# Patient Record
Sex: FEMALE | Race: WHITE
[De-identification: names, ages, dates, MRNs, and addresses within clinical notes are randomized per-mention and may not be internally consistent; named-entity substitution may affect disease eponyms.]

---

## 2017-03-23 ENCOUNTER — HOSPITAL ENCOUNTER (EMERGENCY)
Dept: HOSPITAL 10 - FTE | Age: 37
Discharge: HOME | End: 2017-03-23
Payer: COMMERCIAL

## 2017-03-23 VITALS
HEIGHT: 66 IN | WEIGHT: 139.99 LBS | BODY MASS INDEX: 22.5 KG/M2 | BODY MASS INDEX: 22.5 KG/M2 | WEIGHT: 139.99 LBS | HEIGHT: 66 IN

## 2017-03-23 DIAGNOSIS — L25.9: Primary | ICD-10-CM

## 2017-03-23 PROCEDURE — 99282 EMERGENCY DEPT VISIT SF MDM: CPT

## 2017-03-23 NOTE — ERD
ER Documentation


Chief Complaint


Date/Time


DATE: 3/23/17 


TIME: 22:15


Chief Complaint


linear pattern rash





HPI


Patient is a 37-year-old female with no medical problems who presents with a 

rash.  The patient has no new soaps, lotions, or medications.  It has been 

there for 2 days and is itchy.  She tried Benadryl.  She does have a primary 

doctor but has not seen her primary doctor as of yet.  She has never had this 

before.





ROS


All systems reviewed and are negative except as per history of present illness.





Medications


Home Meds


Active Scripts


Prednisone* (Prednisone*) 20 Mg Tab, 60 MG PO DAILY for 4 Days, TAB


   Prov:NIXON YOUNG MD         3/23/17





Allergies


Allergies:  


Coded Allergies:  


     No Known Allergies (Verified  Allergy, 5/16/12)





PMhx/Soc


Medical and Surgical Hx:  pt denies Medical Hx, pt denies Surgical Hx


Hx Alcohol Use:  No


Hx Substance Use:  No


Hx Tobacco Use:  No


Smoking Status:  Never smoker





FmHx


Family History:  No diabetes





Physical Exam


Vitals





Vital Signs








  Date Time  Temp Pulse Resp B/P Pulse Ox O2 Delivery O2 Flow Rate FiO2


 


3/23/17 20:06 99.1 86 20 124/80 100   








Physical Exam


Const: No acute distress


Head:   Atraumatic 


Eyes:    Normal Conjunctiva


ENT:    Normal External Ears, Nose and Mouth.


Neck:               Full range of motion..~ No meningismus.


Resp:    Clear to auscultation bilaterally


Cardio:    Regular rate and rhythm, no murmurs


Abd:    Soft, non tender, non distended. Normal bowel sounds


Skin:   Patient has a papular rash with blanching, no petechia and no purpura, 

it is diffusely around the body but not inside the mouth or in hands


Back:    No midline or flank tenderness


Ext:    No cyanosis, or edema


Neur:    Awake and alert


Psych:    Normal Mood and Affect


Results 24 hrs





 Current Medications








 Medications


  (Trade)  Dose


 Ordered  Sig/Sveta


 Route


 PRN Reason  Start Time


 Stop Time Status Last Admin


Dose Admin


 


 Prednisone


  (Prednisone)  60 mg  ONCE  ONCE


 PO


   3/23/17 21:00


 3/23/17 21:01 DC 3/23/17 21:06


 











Procedures/MDM


Smoking Cessation Therapy:  Pt. was lectured for greater than  3 minutes on the 

health risks of continued smoking and the benefits of cessation.





Patient is a 37-year-old female who presents with what appears to be a 

dermatitis.  I am not sure as to the cause of the dermatitis at this time does 

not appear to be petechial or purpuric.  I do not think this is a life-

threatening rash.  This seems to be more allergy related than infectious and 

therefore I will treat with 5 days of prednisone.  The first dose was given in 

the emergency department.  She can use Benadryl as needed for itching.  She 

should follow-up with her primary doctor within 24-48 hours for reevaluation.  

She can return sooner for any worsening symptoms.





Departure


Diagnosis:  


 Primary Impression:  


 Dermatitis


 Additional Impression:  


 Rash


Condition:  Fair


Patient Instructions:  Dermatitis, Non-Specific


Referrals:  


Your doctor





Additional Instructions:  


Call your primary care doctor TOMORROW for an appointment during the next 1 

WEEK.Tell the  that you were referred from this facility.See the 

doctor sooner or return here if your  condition worsens before your appointment 

time.











NIXON YOUNG MD Mar 23, 2017 22:17

## 2017-05-20 ENCOUNTER — HOSPITAL ENCOUNTER (INPATIENT)
Dept: HOSPITAL 10 - E/R | Age: 37
LOS: 3 days | Discharge: HOME | DRG: 512 | End: 2017-05-23
Attending: FAMILY MEDICINE | Admitting: FAMILY MEDICINE
Payer: COMMERCIAL

## 2017-05-20 VITALS — RESPIRATION RATE: 18 BRPM | DIASTOLIC BLOOD PRESSURE: 59 MMHG | SYSTOLIC BLOOD PRESSURE: 123 MMHG | HEART RATE: 65 BPM

## 2017-05-20 VITALS
WEIGHT: 143.3 LBS | BODY MASS INDEX: 26.37 KG/M2 | HEIGHT: 62 IN | HEIGHT: 62 IN | WEIGHT: 143.3 LBS | BODY MASS INDEX: 26.37 KG/M2

## 2017-05-20 VITALS — SYSTOLIC BLOOD PRESSURE: 105 MMHG | DIASTOLIC BLOOD PRESSURE: 56 MMHG | RESPIRATION RATE: 18 BRPM

## 2017-05-20 DIAGNOSIS — S52.612A: ICD-10-CM
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
## 2017-05-20 DIAGNOSIS — D64.9: ICD-10-CM
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
## 2017-05-20 DIAGNOSIS — Z72.0: ICD-10-CM

## 2017-05-20 DIAGNOSIS — S52.502A: Primary | ICD-10-CM
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
## 2017-05-20 LAB
ADD SCAN DIFF: NO
ADD UMIC: NO
ALBUMIN SERPL-MCNC: 4.6 G/DL (ref 3.3–4.9)
ALBUMIN/GLOB SERPL: 1.48 {RATIO}
ALP SERPL-CCNC: 74 IU/L (ref 42–121)
ALT SERPL-CCNC: 24 IU/L (ref 13–69)
ANION GAP SERPL CALC-SCNC: 12 MMOL/L (ref 8–16)
AST SERPL-CCNC: 29 IU/L (ref 15–46)
BASOPHILS # BLD AUTO: 0 10^3/UL (ref 0–0.1)
BASOPHILS NFR BLD: 0.1 % (ref 0–2)
BILIRUB DIRECT SERPL-MCNC: 0 MG/DL (ref 0–0.2)
BILIRUB SERPL-MCNC: 0.3 MG/DL (ref 0.2–1.3)
BUN SERPL-MCNC: 14 MG/DL (ref 7–20)
CALCIUM SERPL-MCNC: 9.7 MG/DL (ref 8.4–10.2)
CHLORIDE SERPL-SCNC: 107 MMOL/L (ref 97–110)
CO2 SERPL-SCNC: 25 MMOL/L (ref 21–31)
COLOR UR: (no result)
CREAT SERPL-MCNC: 0.84 MG/DL (ref 0.44–1)
EOSINOPHIL # BLD: 0.1 10^3/UL (ref 0–0.5)
EOSINOPHIL NFR BLD: 0.8 % (ref 0–7)
ERYTHROCYTE [DISTWIDTH] IN BLOOD BY AUTOMATED COUNT: 12.3 % (ref 11.5–14.5)
GLOBULIN SER-MCNC: 3.1 G/DL (ref 1.3–3.2)
GLUCOSE SERPL-MCNC: 99 MG/DL (ref 70–220)
GLUCOSE UR STRIP-MCNC: NEGATIVE %
HCT VFR BLD CALC: 39.6 % (ref 37–47)
HGB BLD-MCNC: 13.1 G/DL (ref 12–16)
INR PPP: 0.87
INR PPP: 0.99
KETONES UR STRIP.AUTO-MCNC: 15 MG/DL
LYMPHOCYTES # BLD AUTO: 2.2 10^3/UL (ref 0.8–2.9)
LYMPHOCYTES NFR BLD AUTO: 21.7 % (ref 15–51)
MCH RBC QN AUTO: 29.6 PG (ref 29–33)
MCHC RBC AUTO-ENTMCNC: 33.1 G/DL (ref 32–37)
MCV RBC AUTO: 89.6 FL (ref 82–101)
MONOCYTES # BLD: 0.6 10^3/UL (ref 0.3–0.9)
MONOCYTES NFR BLD: 5.4 % (ref 0–11)
NEUTROPHILS # BLD: 7.4 10^3/UL (ref 1.6–7.5)
NEUTROPHILS NFR BLD AUTO: 71.6 % (ref 39–77)
NITRITE UR QL STRIP.AUTO: NEGATIVE
NRBC # BLD MANUAL: 0 10^3/UL (ref 0–0)
NRBC BLD QL: 0 /100WBC (ref 0–0)
PLATELET # BLD: 250 10^3/UL (ref 140–415)
PMV BLD AUTO: 12.2 FL (ref 7.4–10.4)
POTASSIUM SERPL-SCNC: 3.6 MMOL/L (ref 3.5–5.1)
PROT SERPL-MCNC: 7.7 G/DL (ref 6.1–8.1)
PROTHROMBIN TIME: 11.8 SEC (ref 12.2–14.2)
PROTHROMBIN TIME: 13.1 SEC (ref 12.2–14.2)
PT RATIO: 0.9
PT RATIO: 1
RBC # BLD AUTO: 4.42 10^6/UL (ref 4.2–5.4)
RBC # UR AUTO: NEGATIVE /UL
SODIUM SERPL-SCNC: 140 MMOL/L (ref 135–144)
URINE BILIRUBIN (DIP): NEGATIVE
URINE TOTAL PROTEIN (DIP): NEGATIVE
UROBILINOGEN UR STRIP-ACNC: (no result) (ref 0.1–1)
WBC # BLD AUTO: 10.3 10^3/UL (ref 4.8–10.8)
WBC # UR STRIP: NEGATIVE /UL

## 2017-05-20 PROCEDURE — 93005 ELECTROCARDIOGRAM TRACING: CPT

## 2017-05-20 PROCEDURE — 71010: CPT

## 2017-05-20 PROCEDURE — 85610 PROTHROMBIN TIME: CPT

## 2017-05-20 PROCEDURE — C1713 ANCHOR/SCREW BN/BN,TIS/BN: HCPCS

## 2017-05-20 PROCEDURE — 80048 BASIC METABOLIC PNL TOTAL CA: CPT

## 2017-05-20 PROCEDURE — 85025 COMPLETE CBC W/AUTO DIFF WBC: CPT

## 2017-05-20 PROCEDURE — 84479 ASSAY OF THYROID (T3 OR T4): CPT

## 2017-05-20 PROCEDURE — 36415 COLL VENOUS BLD VENIPUNCTURE: CPT

## 2017-05-20 PROCEDURE — 80053 COMPREHEN METABOLIC PANEL: CPT

## 2017-05-20 PROCEDURE — 83735 ASSAY OF MAGNESIUM: CPT

## 2017-05-20 PROCEDURE — 96374 THER/PROPH/DIAG INJ IV PUSH: CPT

## 2017-05-20 PROCEDURE — 80061 LIPID PANEL: CPT

## 2017-05-20 PROCEDURE — 85730 THROMBOPLASTIN TIME PARTIAL: CPT

## 2017-05-20 PROCEDURE — 83690 ASSAY OF LIPASE: CPT

## 2017-05-20 PROCEDURE — 73200 CT UPPER EXTREMITY W/O DYE: CPT

## 2017-05-20 PROCEDURE — 83036 HEMOGLOBIN GLYCOSYLATED A1C: CPT

## 2017-05-20 PROCEDURE — 81003 URINALYSIS AUTO W/O SCOPE: CPT

## 2017-05-20 PROCEDURE — 84436 ASSAY OF TOTAL THYROXINE: CPT

## 2017-05-20 PROCEDURE — 73100 X-RAY EXAM OF WRIST: CPT

## 2017-05-20 PROCEDURE — 96375 TX/PRO/DX INJ NEW DRUG ADDON: CPT

## 2017-05-20 PROCEDURE — 84100 ASSAY OF PHOSPHORUS: CPT

## 2017-05-20 PROCEDURE — 84443 ASSAY THYROID STIM HORMONE: CPT

## 2017-05-20 RX ADMIN — HYDROCODONE BITARTRATE AND ACETAMINOPHEN PRN TAB: 5; 325 TABLET ORAL at 16:35

## 2017-05-20 RX ADMIN — HYDROCODONE BITARTRATE AND ACETAMINOPHEN PRN TAB: 5; 325 TABLET ORAL at 23:50

## 2017-05-20 RX ADMIN — HYDROCODONE BITARTRATE AND ACETAMINOPHEN PRN TAB: 5; 325 TABLET ORAL at 18:10

## 2017-05-20 NOTE — RADRPT
PROCEDURE:   CT left upper extremity

 

CLINICAL INDICATION:   Trauma, pain

 

TECHNIQUE:   CT of the left forearm and hand was performed.  Images were reconstructed in the axial,
 sagittal and coronal planes.  No intravenous contrast was given.  One or more on the following dose
 reduction techniques were used: automated exposure control, adjustment of the mA and/or kV accordin
g to patient size, use of iterative reconstruction technique.  CTDI = 7.38 mGy. DLP = 139.36 mGy-cm.

 

COMPARISON:   X-ray, 05/20/2017

 

FINDINGS:

Again noted is acute comminuted fracture of the distal radial metaphysis extending to the distal rad
ial articular surface.  There is severe displacement and angulation of fracture fragments.  Also not
ed is a mildly displaced acute fracture of the ulnar styloid process.  No proximal radial or ulnar f
racture is identified.  There is no evidence of carpal fracture.  No dislocation is identified.  Bon
y mineralization is normal.  There is no radiodense foreign body.  Forearm and wrist cast is identif
ied.

 

IMPRESSION:

 

1.  Acute comminuted fracture of the distal radial metaphysis extending to the articular surface, wi
th severe angulation and displacement of fracture fragments.

2.  Mildly displaced acute fracture of the ulnar styloid process.

3.  Forearm and wrist cast is in place.

 

RPTAT: QQ

_____________________________________________ 

.Sree Fink MD, MD           Date    Time 

Electronically viewed and signed by .Sree Fink MD, MD on 05/20/2017 13:14 

 

D:  05/20/2017 13:14  T:  05/20/2017 13:14

.R/

## 2017-05-20 NOTE — RADRPT
PROCEDURE:   XR Chest. 

 

CLINICAL INDICATION:   Chest pain 

 

TECHNIQUE:   Single portable view  of the chest was obtained 

 

COMPARISON:   None 

 

FINDINGS:

The heart is enlarged.

The lungs are clear. 

There is no pleural effusion or pneumothorax.   

 

RPTAT: AA

 

IMPRESSION:

Mild Cardiomegaly.

_____________________________________________ 

.Surinder Gudino MD, MD           Date    Time 

Electronically viewed and signed by .Surinder Gudino MD, MD on 05/20/2017 11:47 

 

D:  05/20/2017 11:47  T:  05/20/2017 11:47

.S/

## 2017-05-20 NOTE — HP
Date/Time of Note


Date/Time of Note


DATE: 17 


TIME: 16:29





Assessment/Plan


VTE Prophylaxis


VTE Prophylaxis Intervention:  SCD's





Assessment/Plan


Chief Complaint/Hosp Course


Impression and plan





1.  Acute comminuted fracture of the distal radial metaphysis extending to the 

articular surface with severe angulation and displacement of fracture fragments 

on the left side as well as a mildly displaced acute fracture of the ulnar 

styloid process on the left.  Will provide with adequate analgesics.  Surgeon 

consulted.  Tentative plan for surgical intervention.





2.  Reported cigarette smoking.  Cessation advise





Admission process 40 minutes





Discussed plan of care with Dr. Diamond


Problems:  





HPI/ROS


Admit Date/Time


Admit Date/Time


May 20, 2017 at 12:08





Hx of Present Illness


This is a 37-year-old female with no reported past medical history who came to 

[facility] due to reports of recent fall during exercise 

subsequently leading to a left comminuted intra-articular distal radial 

fracture on the left side with noted moderate to severe displacement of 

fracture fragments.  According to the patient she was doing cross fit exercise 

and a jump rope routine when her leg got caught on the rope and she fell and 

braced herself on the left side.  She denies any loss of consciousness 

shortness of breath or headaches precipitating the fall.  She denied any trauma 

to her head and reports she somehow fell on her left wrist.  No other reports 

of pain on either parts of her body.  She subsequently went to [facility].  Upon further imaging of her left wrist as previously 

mentioned she was found to have a comminuted intra-articular distal radial 

fracture with moderate to severe displacement of the fracture fragments.  There 

is also seen ulnar styloid process fracture seen.  CT scan of the left upper 

extremity also confirmed acute comminuted fracture of the distal radial 

metaphysis extending into the articular surface with severe angulation and 

displacement fracture fragments.  There is also seen mildly displaced acute 

fracture of the ulnar styloid process.  CBC and BMP otherwise unremarkable and 

vital signs remained stable.  She still does report having left wrist pain but 

is controlled with opioid medication.  We will evaluate her for the 

aformentiond issues





PMH/Family/Social


Past Medical History


Medical/surgical history





1.   in 


2.  Reported tummy tuck in 





Family History


Significant Family History:  other (Mother side: Diabetes)





Social History


Alcohol Use:  occasionally


Smoking Status:  Current some day smoker


Drug Use:  none





Exam/Review of Systems


Vital Signs


Vitals





 Vital Signs








  Date Time  Temp Pulse Resp B/P Pulse Ox O2 Delivery O2 Flow Rate FiO2


 


17 13:20  77 18 118/68 99 Room Air  


 


17 10:18 97.8       











Exam


Constitutional:  alert, oriented


Psych:  nl mood/affect


Head:  normocephalic


Eyes:  nl conjunctiva


Neck:  supple, 


   No jvd


Respiratory:  clear to auscultation, normal air movement


Cardiovascular:  nl pulses, regular rate and rhythm


Gastrointestinal:  non-tender, soft


Musculoskeletal:  other


Extremities:  normal pulses


Neurological:  nl mental status, nl speech





Labs


Result Diagram:  


17 1200                                                                   

             17 1200








Medications


Medications





 Current Medications


Ondansetron HCl (Zofran Inj) 4 mg Q6H  PRN IV NAUSEA AND/OR VOMITING;  Start  at 16:30


Acetaminophen (Tylenol Tab) 650 mg Q6H  PRN PO PAIN LEVEL 1-3 OR FEVER;  Start  at 16:30


Acetaminophen (Tylenol Supp) 650 mg Q6H  PRN [place] PAIN LEVEL 1-3 OR FEVER;  Start 

17 at 16:30


Acetaminophen/ Hydrocodone Bitart (Norco (5/325)) 1 tab Q6H  PRN PO MODERATE 

PAIN LEVEL 4-6;  Start 17 at 16:30


Acetaminophen/ Hydrocodone Bitart (Norco (5/325)) 2 tab Q6H  PRN PO SEVERE PAIN 

LEVEL 7-10;  Start 17 at 16:30


Morphine Sulfate (morphine) 2 mg Q4H  PRN IV SEVERE PAIN LEVEL 7-10;  Start  at 16:30


Hydromorphone HCl (Dilaudid) 0.5 mg Q4H  PRN IV SEVERE PAIN LEVEL 7-10;  Start  at 16:30


Docusate Sodium (Colace) 100 mg Q12H  PRN PO CONSTIPATION;  Start 17 at 16:

30


Magnesium Hydroxide (Milk Of Mag) 30 ml DAILY  PRN PO CONSTIPATION;  Start  at 16:30


Bisacodyl (Dulcolax Supp) 10 mg DAILY  PRN [place] CONSTIPATION;  Start 17 at 16

:30











AUSTIN DENNIS May 20, 2017 16:34

## 2017-05-20 NOTE — ERD
ER Documentation


Chief Complaint


Date/Time


DATE: 5/20/17 


TIME: 11:17


Chief Complaint


left wrist injury/deformity noted





HPI


37-year-old woman brought in by EMS for left wrist pain and swelling after 

falling on outstretched hand while exercising.  Patient denies head or neck 

injury or loss of consciousness.  She was placed in an air splint by EMS and 

transported here.  She has had no chest pain or shortness of breath, no 

vomiting or diarrhea, no headache or blurry vision.





ROS


All systems reviewed and are negative except as per history of present illness.





Medications


Home Meds


Active Scripts


Prednisone* (Prednisone*) 20 Mg Tab, 60 MG PO DAILY for 4 Days, TAB


   Prov:NIXON YOUNG MD         3/23/17





Allergies


Allergies:  


Coded Allergies:  


     No Known Allergies (Verified  Allergy, 5/16/12)





PMhx/Soc


None


Hx Alcohol Use:  No


Hx Substance Use:  No


Hx Tobacco Use:  No





FmHx


Family History:  No diabetes





Physical Exam


Vitals





Vital Signs








  Date Time  Temp Pulse Resp B/P Pulse Ox O2 Delivery O2 Flow Rate FiO2


 


5/20/17 10:18 97.8 78 16 106/58 99   








Physical Exam


GENERAL: Well-developed, well-nourished, in moderate pain


HEENT: Moist mucous membranes, pink conjunctiva, no cervical spine tenderness 

or step-off deformities, no goiter, no jaundice or icterus, extraocular 

movements intact without pain. No submandibular induration, and no pharyngeal 

erythema


NEURO: Alert and oriented 3, cranial nerves II through XII intact bilaterally, 

pupils equal round reactive to light, no focal deficits or facial asymmetry, 

sensation intact distally Strength 5/5 in upper and lower extremities 

bilaterally


CARDIAC: Regular rate and rhythm, no murmurs rubs or gallops


LUNGS: Clear bilaterally no wheezing crackles or stridor


ABDOMEN: Soft nontender, no guarding, no rigidity, no rebound, no psoas sign no 

obturator sign. Normoactive bowel sounds


SKIN: Warm and dry to touch, positive hematoma and contusion to the left dorsal 

and volar wrist, no target lesions, and without ulcers


EXTREMITIES: Positive dinner fork deformity of the left wrist with skin tenting 

edema and hematoma formation.  Distal pulses equal bilateral, cap refill less 

than 2 second


PSYCH: Normal affect without agitation or irritability


Result Diagram:  


5/20/17 1200





Results 24 hrs





 Laboratory Tests








Test


  5/20/17


12:00


 


White Blood Count 10.310^3/ul 


 


Red Blood Count 4.4210^6/ul 


 


Hemoglobin 13.1g/dl 


 


Hematocrit 39.6% 


 


Mean Corpuscular Volume 89.6fl 


 


Mean Corpuscular Hemoglobin 29.6pg 


 


Mean Corpuscular Hemoglobin


Concent 33.1g/dl 


 


 


Red Cell Distribution Width 12.3% 


 


Platelet Count 72158^3/UL 


 


Mean Platelet Volume 12.2fl 


 


Neutrophils % 71.6% 


 


Lymphocytes % 21.7% 


 


Monocytes % 5.4% 


 


Eosinophils % 0.8% 


 


Basophils % 0.1% 


 


Nucleated Red Blood Cells % 0.0/100WBC 


 


Neutrophils # 7.410^3/ul 


 


Lymphocytes # 2.210^3/ul 


 


Monocytes # 0.610^3/ul 


 


Eosinophils # 0.110^3/ul 


 


Basophils # 0.010^3/ul 


 


Nucleated Red Blood Cells # 0.010^3/ul 


 


Prothrombin Time 11.8Sec 


 


Prothrombin Time Ratio 0.9 


 


INR International Normalized


Ratio 0.87 


 








 Current Medications








 Medications


  (Trade)  Dose


 Ordered  Sig/Sveta


 Route


 PRN Reason  Start Time


 Stop Time Status Last Admin


Dose Admin


 


 Hydromorphone HCl


  (Dilaudid)  1 mg  ONCE  STAT


 IV


   5/20/17 10:29


 5/20/17 10:31 DC 5/20/17 10:38


 


 


 Ondansetron HCl


  (Zofran Inj)  4 mg  ONCE  STAT


 IV


   5/20/17 10:29


 5/20/17 10:31 DC 5/20/17 10:38


 


 


 Propofol 100 mg  100 mg  ONCE  ONCE


 IV


   5/20/17 10:30


 5/20/17 10:33 DC 5/20/17 11:33


 


 


 Sodium Chloride


  (NS)  1,000 ml @ 


 1,000 mls/hr  Q1H ONCE


 IV


   5/20/17 10:30


 5/20/17 11:29 DC 5/20/17 10:39


 


 


 Fentanyl 100 mcg  100 mcg  ONCE  ONCE


 IV


   5/20/17 11:00


 5/20/17 11:01 DC 5/20/17 11:34


 


 


 Sodium Chloride


  (NS)  1,000 ml @ 


 1,000 mls/hr  Q1H STAT


 IV


   5/20/17 12:00


 5/20/17 12:59   


 











Procedures/MDM


IV line was established patient was placed on cardiac monitor rhythm strip 

revealed a sinus rhythm at about 80 bpm with upright P and T waves.  Patient 

was afebrile.





I administered 2 L normal saline intravenously, hydromorphone 1 mg IV 2, and 

Zofran 4 mg IV with good effect.





Left wrist x-ray was ordered prior to closed reduction and conscious sedation 

although it was severely delayed and I felt the risks of waiting for imaging 

will need benefits of doing a immediate closed reduction without imaging 

studies.





Procedural Sedation: Pre-assessment performed.  See preceding complete history 

and physical for details. Time out performed.  See sedation documentation for 

details. Risk, benefits and alternatives were discussed with the patient. 





Medication(s): Fentanyl 25 mcg IV and propofol 100 mg IV


Complications: No hypoxic or apneic events





Recovered without incident.  A minimum of 16 minutes of face to face time was 

performed including preparation, sedation and recovery time.





Reduction by me:


Anesthesia:     With fentanyl and propofol


Location:      Left wrist


Technique:     Gentle traction and manipulation


Results:          Restoration of normal anatomic positioning was extremely 

difficult due to laxity at the radial ulnar joint distally on the left wrist 

overall.  Despite difficulties anatomic alignment did improve and she was 

placed in a sugar tong splint at the left upper extremity which was 

circumferentially wrapped using Ace elastic bandage.





Neurovascularly intact post procedure.





Splint Assessment: Neurovascularly intact post splint placement with good fit.





Post conscious sedation and reduction patient's mental status completely 

improved and her vital signs remained normal.





X-ray left wrist 3V Interpreted by me: 


Scaphoid:   Normal


Bones:    Comminuted fracture at the distal radius and ulnar styloid


Joints:       There is disruption most likely at the dorsal radial ulnar and 

collateral ligaments at the left wrist, concerning for unstable wrist fracture.


Foreign body:    None





Chest X-ray 1V Interpreted by me:


Soft Tissue:                                               No acute 

abnormalities


Bones:                                                    No acute abnormalities


Mediastinum/Cardiac Silhouette/Lungs:     No acute abnormalities





CBC electrolytes and liver function tests are all normal, coagulation profile 

was normal.





Immediate orthopedic consultation was obtained with Dr. Coello, I spoke to him 

regarding the patient's presentation, symptomatology, and x-ray findings and he 

agreed the patient will require admission for surgical ORIF of the left wrist 

given the laxity.





I ordered CT scan of the left upper extremity, results are pending I will follow

-up.





Patient will be admitted to [facility] for continued medical and pain management 

as well as surgical ORIF.





Departure


Diagnosis:  


 Primary Impression:  


 Wrist fracture, left


 Encounter type:  initial encounter  Fracture type:  closed  Qualified Code:  

S62.102A - Wrist fracture, left, closed, initial encounter


Condition:  KAMILA Garcia MD May 20, 2017 11:20

## 2017-05-20 NOTE — RADRPT
PROCEDURE:   XR Wrist. 

 

CLINICAL INDICATION:   Trauma, pain 

 

TECHNIQUE:   AP, lateral and oblique views of the left wrist were performed. 

 

COMPARISON:   No prior studies are available for comparison. 

 

FINDINGS:

 

Overlying cast material limits evaluation of fine detail.  There is acute comminuted fracture of the
 distal radial metaphysis extending to the distal radial articular surface, with moderate to severe 
displacement of fracture fragments.  No dislocation is identified. There is no radiopaque foreign herman
dy.  

 

IMPRESSION:

 

1.  Overlying cast material limits evaluation of fine detail.

2.  Comminuted intra-articular distal radial fracture is noted, with moderate to severe displacement
 of fracture fragments.

3.  Ulnar styloid process fracture is also seen.  

 

RPTAT: QQ

_____________________________________________ 

.Sree Fink MD, MD           Date    Time 

Electronically viewed and signed by .Sree Fink MD, MD on 05/20/2017 11:48 

 

D:  05/20/2017 11:48  T:  05/20/2017 11:48

.R/

## 2017-05-21 VITALS — HEART RATE: 80 BPM | DIASTOLIC BLOOD PRESSURE: 57 MMHG | RESPIRATION RATE: 16 BRPM | SYSTOLIC BLOOD PRESSURE: 117 MMHG

## 2017-05-21 VITALS — RESPIRATION RATE: 17 BRPM | HEART RATE: 56 BPM | SYSTOLIC BLOOD PRESSURE: 115 MMHG | DIASTOLIC BLOOD PRESSURE: 53 MMHG

## 2017-05-21 VITALS — RESPIRATION RATE: 17 BRPM | DIASTOLIC BLOOD PRESSURE: 51 MMHG | HEART RATE: 56 BPM | SYSTOLIC BLOOD PRESSURE: 117 MMHG

## 2017-05-21 VITALS — SYSTOLIC BLOOD PRESSURE: 116 MMHG | DIASTOLIC BLOOD PRESSURE: 58 MMHG | HEART RATE: 72 BPM | RESPIRATION RATE: 16 BRPM

## 2017-05-21 VITALS — DIASTOLIC BLOOD PRESSURE: 53 MMHG | SYSTOLIC BLOOD PRESSURE: 119 MMHG | RESPIRATION RATE: 17 BRPM | HEART RATE: 58 BPM

## 2017-05-21 VITALS — DIASTOLIC BLOOD PRESSURE: 55 MMHG | HEART RATE: 58 BPM | SYSTOLIC BLOOD PRESSURE: 116 MMHG | RESPIRATION RATE: 18 BRPM

## 2017-05-21 VITALS — RESPIRATION RATE: 16 BRPM | SYSTOLIC BLOOD PRESSURE: 118 MMHG | DIASTOLIC BLOOD PRESSURE: 56 MMHG | HEART RATE: 91 BPM

## 2017-05-21 VITALS — RESPIRATION RATE: 18 BRPM | DIASTOLIC BLOOD PRESSURE: 56 MMHG | HEART RATE: 66 BPM | SYSTOLIC BLOOD PRESSURE: 105 MMHG

## 2017-05-21 VITALS — DIASTOLIC BLOOD PRESSURE: 48 MMHG | RESPIRATION RATE: 17 BRPM | HEART RATE: 68 BPM | SYSTOLIC BLOOD PRESSURE: 118 MMHG

## 2017-05-21 VITALS — SYSTOLIC BLOOD PRESSURE: 117 MMHG | RESPIRATION RATE: 21 BRPM | DIASTOLIC BLOOD PRESSURE: 46 MMHG | HEART RATE: 64 BPM

## 2017-05-21 VITALS — SYSTOLIC BLOOD PRESSURE: 115 MMHG | HEART RATE: 56 BPM | DIASTOLIC BLOOD PRESSURE: 46 MMHG | RESPIRATION RATE: 17 BRPM

## 2017-05-21 VITALS — SYSTOLIC BLOOD PRESSURE: 116 MMHG | HEART RATE: 58 BPM | DIASTOLIC BLOOD PRESSURE: 53 MMHG | RESPIRATION RATE: 21 BRPM

## 2017-05-21 VITALS — HEART RATE: 60 BPM | SYSTOLIC BLOOD PRESSURE: 119 MMHG | DIASTOLIC BLOOD PRESSURE: 54 MMHG | RESPIRATION RATE: 18 BRPM

## 2017-05-21 VITALS — RESPIRATION RATE: 19 BRPM | DIASTOLIC BLOOD PRESSURE: 68 MMHG | SYSTOLIC BLOOD PRESSURE: 122 MMHG

## 2017-05-21 VITALS — SYSTOLIC BLOOD PRESSURE: 112 MMHG | RESPIRATION RATE: 18 BRPM | HEART RATE: 80 BPM | DIASTOLIC BLOOD PRESSURE: 60 MMHG

## 2017-05-21 VITALS — HEART RATE: 62 BPM | DIASTOLIC BLOOD PRESSURE: 53 MMHG | SYSTOLIC BLOOD PRESSURE: 123 MMHG | RESPIRATION RATE: 19 BRPM

## 2017-05-21 VITALS — SYSTOLIC BLOOD PRESSURE: 110 MMHG | RESPIRATION RATE: 19 BRPM | DIASTOLIC BLOOD PRESSURE: 49 MMHG | HEART RATE: 56 BPM

## 2017-05-21 VITALS — RESPIRATION RATE: 20 BRPM | DIASTOLIC BLOOD PRESSURE: 52 MMHG | SYSTOLIC BLOOD PRESSURE: 118 MMHG | HEART RATE: 62 BPM

## 2017-05-21 VITALS — RESPIRATION RATE: 16 BRPM | SYSTOLIC BLOOD PRESSURE: 128 MMHG | DIASTOLIC BLOOD PRESSURE: 57 MMHG | HEART RATE: 75 BPM

## 2017-05-21 VITALS — HEART RATE: 80 BPM | SYSTOLIC BLOOD PRESSURE: 115 MMHG | DIASTOLIC BLOOD PRESSURE: 62 MMHG | RESPIRATION RATE: 18 BRPM

## 2017-05-21 VITALS — SYSTOLIC BLOOD PRESSURE: 113 MMHG | RESPIRATION RATE: 18 BRPM | HEART RATE: 58 BPM | DIASTOLIC BLOOD PRESSURE: 49 MMHG

## 2017-05-21 VITALS — RESPIRATION RATE: 18 BRPM | SYSTOLIC BLOOD PRESSURE: 118 MMHG | DIASTOLIC BLOOD PRESSURE: 48 MMHG | HEART RATE: 66 BPM

## 2017-05-21 VITALS — DIASTOLIC BLOOD PRESSURE: 56 MMHG | RESPIRATION RATE: 16 BRPM | SYSTOLIC BLOOD PRESSURE: 115 MMHG | HEART RATE: 79 BPM

## 2017-05-21 LAB
ADD SCAN DIFF: NO
ALBUMIN SERPL-MCNC: 3.6 G/DL (ref 3.3–4.9)
ALBUMIN/GLOB SERPL: 1.28 {RATIO}
ALP SERPL-CCNC: 53 IU/L (ref 42–121)
ALT SERPL-CCNC: 21 IU/L (ref 13–69)
ANION GAP SERPL CALC-SCNC: 8 MMOL/L (ref 8–16)
AST SERPL-CCNC: 23 IU/L (ref 15–46)
BASOPHILS # BLD AUTO: 0 10^3/UL (ref 0–0.1)
BASOPHILS NFR BLD: 0.1 % (ref 0–2)
BILIRUB DIRECT SERPL-MCNC: 0 MG/DL (ref 0–0.2)
BILIRUB SERPL-MCNC: 0.2 MG/DL (ref 0.2–1.3)
BUN SERPL-MCNC: 10 MG/DL (ref 7–20)
CALCIUM SERPL-MCNC: 8.4 MG/DL (ref 8.4–10.2)
CHLORIDE SERPL-SCNC: 107 MMOL/L (ref 97–110)
CHOLEST SERPL-MCNC: 152 MG/DL (ref 100–200)
CHOLEST/HDLC SERPL: 2.5 RATIO
CO2 SERPL-SCNC: 24 MMOL/L (ref 21–31)
CREAT SERPL-MCNC: 0.68 MG/DL (ref 0.44–1)
EOSINOPHIL # BLD: 0.1 10^3/UL (ref 0–0.5)
EOSINOPHIL NFR BLD: 1.4 % (ref 0–7)
ERYTHROCYTE [DISTWIDTH] IN BLOOD BY AUTOMATED COUNT: 12.6 % (ref 11.5–14.5)
GLOBULIN SER-MCNC: 2.8 G/DL (ref 1.3–3.2)
GLUCOSE SERPL-MCNC: 99 MG/DL (ref 70–220)
HCT VFR BLD CALC: 35.8 % (ref 37–47)
HDLC SERPL-MCNC: 59 MG/DL (ref 34–82)
HGB BLD-MCNC: 11.9 G/DL (ref 12–16)
LYMPHOCYTES # BLD AUTO: 2.1 10^3/UL (ref 0.8–2.9)
LYMPHOCYTES NFR BLD AUTO: 25.9 % (ref 15–51)
MAGNESIUM SERPL-MCNC: 1.8 MG/DL (ref 1.7–2.5)
MCH RBC QN AUTO: 29.9 PG (ref 29–33)
MCHC RBC AUTO-ENTMCNC: 33.2 G/DL (ref 32–37)
MCV RBC AUTO: 89.9 FL (ref 82–101)
MONOCYTES # BLD: 0.6 10^3/UL (ref 0.3–0.9)
MONOCYTES NFR BLD: 7.2 % (ref 0–11)
NEUTROPHILS # BLD: 5.2 10^3/UL (ref 1.6–7.5)
NEUTROPHILS NFR BLD AUTO: 65 % (ref 39–77)
NRBC # BLD MANUAL: 0 10^3/UL (ref 0–0)
NRBC BLD QL: 0 /100WBC (ref 0–0)
PHOSPHATE SERPL-MCNC: 3.1 MG/DL (ref 2.5–4.9)
PLATELET # BLD: 212 10^3/UL (ref 140–415)
PMV BLD AUTO: 12.1 FL (ref 7.4–10.4)
POTASSIUM SERPL-SCNC: 4 MMOL/L (ref 3.5–5.1)
PROT SERPL-MCNC: 6.4 G/DL (ref 6.1–8.1)
RBC # BLD AUTO: 3.98 10^6/UL (ref 4.2–5.4)
SODIUM SERPL-SCNC: 135 MMOL/L (ref 135–144)
T3RU NFR SERPL: 38.8 % (ref 23.5–40.5)
TRIGL SERPL-MCNC: 64 MG/DL (ref 0–149)
TSH SERPL-ACNC: 4.47 MIU/L (ref 0.47–4.68)
WBC # BLD AUTO: 8 10^3/UL (ref 4.8–10.8)

## 2017-05-21 PROCEDURE — 0PSJ04Z REPOSITION LEFT RADIUS WITH INTERNAL FIXATION DEVICE, OPEN APPROACH: ICD-10-PCS | Performed by: ORTHOPAEDIC SURGERY

## 2017-05-21 RX ADMIN — FOLIC ACID SCH MLS/HR: 5 INJECTION, SOLUTION INTRAMUSCULAR; INTRAVENOUS; SUBCUTANEOUS at 12:29

## 2017-05-21 RX ADMIN — HYDROMORPHONE HYDROCHLORIDE PRN MG: 1 INJECTION, SOLUTION INTRAMUSCULAR; INTRAVENOUS; SUBCUTANEOUS at 06:00

## 2017-05-21 RX ADMIN — CEFAZOLIN SCH MLS/HR: 1 INJECTION, POWDER, FOR SOLUTION INTRAMUSCULAR; INTRAVENOUS at 20:26

## 2017-05-21 RX ADMIN — HYDROCODONE BITARTRATE AND ACETAMINOPHEN PRN TAB: 5; 325 TABLET ORAL at 23:36

## 2017-05-21 RX ADMIN — FOLIC ACID SCH MLS/HR: 5 INJECTION, SOLUTION INTRAMUSCULAR; INTRAVENOUS; SUBCUTANEOUS at 22:56

## 2017-05-21 RX ADMIN — CEFAZOLIN SCH MLS/HR: 1 INJECTION, POWDER, FOR SOLUTION INTRAMUSCULAR; INTRAVENOUS at 15:39

## 2017-05-21 RX ADMIN — FAMOTIDINE SCH MG: 20 TABLET ORAL at 20:26

## 2017-05-21 NOTE — RADRPT
PROCEDURE:   Intraoperative fluoroscopy

 

CLINICAL INDICATION:   Open reduction internal fixation left wrist

 

TECHNIQUE:   A total of32  seconds of fluoroscopy time was utilized. Four Images are submitted for i
nterpretation.

 

COMPARISON: CT wrist 05/20/2017. 

 

FINDINGS:

Successful ORIF was performed with 2 K-wires traversing the comminuted fracture of the distal radius
 and ulna.  The fracture fragments are in good alignment.  32 seconds of fluoroscopy time used

 

IMPRESSION:

Successful ORIF of the left distal radius comminuted fracture.  32 seconds of fluoroscopy time used.

 

RPTAT: EE

_____________________________________________ 

.Sami Grissom MD, MD           Date    Time 

Electronically viewed and signed by .Sami Grissom MD, MD on 05/21/2017 15:36 

 

D:  05/21/2017 15:36  T:  05/21/2017 15:36

.M/

## 2017-05-21 NOTE — PN
DATE:  05/21/2017

 

 

TIME OF EVALUATION:  1500 HOURS.

 

SUBJECTIVE DATA:  The patient is status post surgical repair of her left wrist 
fracture.  Pain well controlled.  Denies any other complaints.

 

OBJECTIVE DATA:  

VITAL SIGNS:  Temp 98.2, pulse rate 60, respiratory rate 18, blood pressure 119/
54.  Oxygen saturation 100% on room air.

GENERAL:  This is a well-built, well-nourished female patient lying in bed in 
no apparent distress.

HEENT:  Head normocephalic and atraumatic.  Eyes:  Anicteric sclerae.  
Conjunctivae clear.

ENT:  Nasal septum is midline.  Oral mucosa is dry.

NECK:  Supple.  No JVD noticed.

RESPIRATORY:  Bilaterally clear to auscultation.  No adventitious breath sounds 
heard.  No use of accessory muscles of respiration.

CARDIAC:  Regular rate and rhythm.  No murmurs.

ABDOMEN:  Soft, nontender and nondistended.  Bowel sounds positive in all 4 
quadrants.

GENITOURINARY:  Deferred.

EXTREMITIES:  Left upper extremit sling in place.  Bilateral lower extremities, 
no edema.  Peripheral pulses palpable.

NEUROLOGIC:  The patient is awake, alert and oriented.  Cranial nerves are 
grossly intact.

 

LABORATORY AND DIAGNOSTIC DATA:  WBC 8.0, hemoglobin 11.9, hematocrit 35.8, 
platelet count 212.  Sodium 135, potassium 4.0, chloride 107, carbon dioxide 25
, anion gap 8, BUN 10, creatinine 0.66, glucose 99, calcium 8.4, phosphorus 3.1
, magnesium 1.8.

 

ASSESSMENT AND PLAN:

1.  Acute comminuted fracture of the distal radial metaphysis extending to the 
articular surface.  Status post surgical repair.  Continue pain control.



2.  Nicotine use.  Cessation advised.



3.  Normocytic normochromic anemia.  Monitor H and H closely.



4.  Fluid, electrolytes and nutrition.  Regular diet as tolerated.



5.  Deep venous thrombosis prophylaxis.  As per orthopedic surgery.



6.  Gastrointestinal prophylaxis.  Histamine 2 receptor blockers.  



7.  Plan. Continue pain control.  Postoperative management as per orthopedic 
surgery.

 

The case was discussed with Dr. Crarasquillo.

 

 

LUIS FERNANDO CARRASQUILLO MD, AM/BRIAN

DD:    05/21/2017 15:25:35

DT:    05/21/2017 16:20:28

Conf#: [id number]

DID#:  [id number]

 

MTDD

## 2017-05-21 NOTE — RADRPT
PROCEDURE:   XR Wrist. 

 

CLINICAL INDICATION:   Pain. 

 

TECHNIQUE:   AP and lateral views of the left wrist were performed. 

 

COMPARISON:   Wrist radiographs and CT wrist from 05/20/2017

 

FINDINGS:

Two K- wires are now traversing the comminuted distal radial metaphysis fracture consistent with int
erval ORIF left wrist. Soft tissue swelling is present.  The bones appear well mineralized. The join
t spaces are well preserved. The soft tissues appear intact. Overlying cast limits evaluation

 

IMPRESSION:

Status post ORIF left distal radius fracture in good anatomic alignment.  Overlying cast is present

 

RPTAT: EE

_____________________________________________ 

.Sami Grissom MD, MD           Date    Time 

Electronically viewed and signed by .Sami Grissom MD, MD on 05/21/2017 15:38 

 

D:  05/21/2017 15:38  T:  05/21/2017 15:38

.JOE/

## 2017-05-21 NOTE — HPN
Date/Time of Note


Date/Time of Note


DATE: 5/21/17 


TIME: 10:34





Interval H&P Admission Note


Pt. seen H&P reviewed:  No system changes











NASIR RAM MD May 21, 2017 10:35

## 2017-05-21 NOTE — PN
Date/Time of Note


Date/Time of Note


DATE: 5/21/17 


TIME: 11:41





Subjective


24 Hr Interval Summary


Free Text/Dictation


Procedure note for L Supraclavicular  single shot block. L neck/clavicle 

prepped and draped in sterile fashion. Time out performed. U/S used to 

visualize Supraclavicular nerve bundle. 25GA needle with 2% Lido for skin and 

sq. 21GA 80mm needle used to inject 20cc of 0.2% Ropivacaine around nerve 

bundle with aspiration before injection. No complications. Adequate block 

achieved.





Exam/Review of Systems


Vital Signs


Vitals





 Vital Signs








  Date Time  Temp Pulse Resp B/P Pulse Ox O2 Delivery O2 Flow Rate FiO2


 


5/21/17 07:39 98.0  19 122/68 98   


 


5/20/17 19:45  67      


 


5/20/17 13:35      Room Air  














 Intake and Output   


 


 5/20/17 5/20/17 5/21/17





 14:59 22:59 06:59


 


Intake Total  500 ml 500 ml


 


Balance  500 ml 500 ml











Results


Result Diagram:  


5/21/17 0443                                                                   

             5/21/17 0443





Results 24 hrs





Laboratory Tests








Test


  5/20/17


12:00 5/20/17


13:00 5/20/17


19:05 5/21/17


04:43


 


White Blood Count 10.3     8.0  #


 


Red Blood Count 4.42     3.98  L


 


Hemoglobin 13.1     11.9  L


 


Hematocrit 39.6     35.8  L


 


Mean Corpuscular Volume 89.6     89.9  


 


Mean Corpuscular Hemoglobin 29.6     29.9  


 


Mean Corpuscular Hemoglobin


Concent 33.1  


  


  


  33.2  


 


 


Red Cell Distribution Width 12.3     12.6  


 


Platelet Count 250     212  


 


Mean Platelet Volume 12.2  H   12.1  H


 


Neutrophils % 71.6     65.0  


 


Lymphocytes % 21.7     25.9  


 


Monocytes % 5.4     7.2  


 


Eosinophils % 0.8     1.4  


 


Basophils % 0.1     0.1  


 


Nucleated Red Blood Cells % 0.0     0.0  


 


Neutrophils # 7.4     5.2  


 


Lymphocytes # 2.2     2.1  


 


Monocytes # 0.6     0.6  


 


Eosinophils # 0.1     0.1  


 


Basophils # 0.0     0.0  


 


Nucleated Red Blood Cells # 0.0     0.0  


 


Prothrombin Time 11.8  L  13.1   


 


Prothrombin Time Ratio 0.9    1.0   


 


INR International Normalized


Ratio 0.87  


  


  0.99  


  


 


 


Sodium Level 140     135  


 


Potassium Level 3.6     4.0  


 


Chloride Level 107     107  


 


Carbon Dioxide Level 25     24  


 


Anion Gap 12     8  


 


Blood Urea Nitrogen 14     10  


 


Creatinine 0.84     0.68  


 


Glucose Level 99     99  


 


Calcium Level 9.7     8.4  


 


Total Bilirubin 0.3     0.2  


 


Direct Bilirubin 0.00     0.00  


 


Indirect Bilirubin 0.3     0.2  


 


Aspartate Amino Transf


(AST/SGOT) 29  


  


  


  23  


 


 


Alanine Aminotransferase


(ALT/SGPT) 24  


  


  


  21  


 


 


Alkaline Phosphatase 74     53  


 


Total Protein 7.7     6.4  #


 


Albumin 4.6     3.6  #


 


Globulin 3.10     2.80  


 


Albumin/Globulin Ratio 1.48     1.28  


 


Lipase 57     


 


Urine Color  LT. YELLOW    


 


Urine Clarity  CLEAR    


 


Urine pH  6.0    


 


Urine Specific Gravity  1.020    


 


Urine Ketones  15    


 


Urine Nitrite  NEGATIVE    


 


Urine Bilirubin  NEGATIVE    


 


Urine Urobilinogen  0.2  E.U./dL    


 


Urine Leukocyte Esterase  NEGATIVE    


 


Urine Hemoglobin  NEGATIVE    


 


Urine Glucose  NEGATIVE    


 


Urine Total Protein  NEGATIVE    


 


Hemoglobin A1c    5.3  


 


Phosphorus Level    3.1  


 


Magnesium Level    1.8  


 


Triglycerides Level    64  


 


Cholesterol Level    152  


 


LDL Cholesterol, Calculated    80  


 


HDL Cholesterol    59  


 


Cholesterol/HDL Ratio    2.5  


 


Thyroid Stimulating Hormone


(TSH) 


  


  


  4.470  


 


 


Free Thyroxine Index    2.52  


 


Thyroxine (T4)    6.5  


 


Triiodothyronine (T3) Uptake    38.8  














Test


  5/21/17


05:00 


  


  


 


 


Activated Partial


Thromboplast Time 28.3  


  


  


  


 











Medications


Medications





 Current Medications


Ondansetron HCl (Zofran Inj) 4 mg Q6H  PRN IV NAUSEA AND/OR VOMITING;  Start 5/ 20/17 at 16:30


Acetaminophen (Tylenol Tab) 650 mg Q6H  PRN PO PAIN LEVEL 1-3 OR FEVER;  Start 5 /20/17 at 16:30


Acetaminophen (Tylenol Supp) 650 mg Q6H  PRN [place] PAIN LEVEL 1-3 OR FEVER;  Start 

5/20/17 at 16:30


Acetaminophen/ Hydrocodone Bitart (Norco (5/325)) 1 tab Q6H  PRN PO MODERATE 

PAIN LEVEL 4-6 Last administered on 5/20/17at 18:10; Admin Dose 1 TAB;  Start 5/ 20/17 at 16:30


Acetaminophen/ Hydrocodone Bitart (Norco (5/325)) 2 tab Q6H  PRN PO SEVERE PAIN 

LEVEL 7-10 Last administered on 5/20/17at 23:50; Admin Dose 2 TAB;  Start 5/20/ 17 at 16:30


Morphine Sulfate (morphine) 2 mg Q4H  PRN IV SEVERE PAIN LEVEL 7-10 Last 

administered on 5/20/17at 21:00; Admin Dose 2 MG;  Start 5/20/17 at 16:30


Hydromorphone HCl (Dilaudid) 0.5 mg Q4H  PRN IV SEVERE PAIN LEVEL 7-10 Last 

administered on 5/21/17at 06:00; Admin Dose 0.5 MG;  Start 5/20/17 at 16:30


Docusate Sodium (Colace) 100 mg Q12H  PRN PO CONSTIPATION;  Start 5/20/17 at 16:

30


Magnesium Hydroxide (Milk Of Mag) 30 ml DAILY  PRN PO CONSTIPATION;  Start 5/20/ 17 at 16:30


Bisacodyl (Dulcolax Supp) 10 mg DAILY  PRN [place] CONSTIPATION;  Start 5/20/17 at 16

:30











NIK GRIDER May 21, 2017 11:47

## 2017-05-21 NOTE — OPR
DATE OF OPERATION:  05/21/2017

 

 

PREOPERATIVE DIAGNOSIS:  Dislocated fracture involving the distal metaphysis of the radius, left wri
st.

 

POSTOPERATIVE DIAGNOSIS:  Dislocated fracture involving the distal metaphysis of the radius, left wr
ist.

 

PROCEDURE PERFORMED:  Manipulative reduction of the distal radius fracture of the left wrist and imm
obilization with the pin fixation.

 

ANESTHESIA:  General anesthesia.

 

SURGEON:  Trice Ram MD

 

PROCEDURE AND FINDINGS:  Under the proper general anesthesia, the patient was placed in supine posit
ion upon the operating table.  Following usual preparation, the manipulative reduction was carried o
ut by exerting distal axial traction followed by exaggeration of the angulation of the distal segmen
t followed by digital pressure on the distal segment volarward.  After obtaining grossly acceptable 
alignment, the left wrist was then placed in a Chinese finger trap and, with the weight hanging at t
he elbow joint downward, the left wrist was hanged from the IV pole.  Following further manipulation
, biplane x-rays were obtained and it showed an entirely satisfactory alignment of the fracture.  Af
ter confirming satisfactory alignment, a pin fixation was carried out using 2 K-wires and 2 K-wires 
were placed in an x-ray position starting from medial and lateral condylar tip and crisscrossing at 
the fracture site.  At the end of the pin fixation, the entire alignment was satisfactory.  After pr
oper pin care, the left forearm was immobilized in a short arm posterior splint.

 

The patient tolerated the entire procedure very well and was sent to the recovery room in excellent 
condition.

 

 

Dictated By: TRICE RAM MD

 

IK/NTS

DD:    05/21/2017 12:43:30

DT:    05/21/2017 17:03:56

Conf#: [id number]

DID#:  [id number]

CC: AGUILAR CARRASQUILLO MD;*EndCC*

## 2017-05-22 VITALS — RESPIRATION RATE: 19 BRPM | SYSTOLIC BLOOD PRESSURE: 126 MMHG | DIASTOLIC BLOOD PRESSURE: 78 MMHG

## 2017-05-22 VITALS — DIASTOLIC BLOOD PRESSURE: 59 MMHG | SYSTOLIC BLOOD PRESSURE: 114 MMHG | RESPIRATION RATE: 16 BRPM

## 2017-05-22 LAB
ADD SCAN DIFF: NO
ANION GAP SERPL CALC-SCNC: 8 MMOL/L (ref 8–16)
BASOPHILS # BLD AUTO: 0 10^3/UL (ref 0–0.1)
BASOPHILS NFR BLD: 0.1 % (ref 0–2)
BUN SERPL-MCNC: 8 MG/DL (ref 7–20)
CALCIUM SERPL-MCNC: 8.5 MG/DL (ref 8.4–10.2)
CHLORIDE SERPL-SCNC: 109 MMOL/L (ref 97–110)
CO2 SERPL-SCNC: 24 MMOL/L (ref 21–31)
CREAT SERPL-MCNC: 0.63 MG/DL (ref 0.44–1)
EOSINOPHIL # BLD: 0.1 10^3/UL (ref 0–0.5)
EOSINOPHIL NFR BLD: 0.9 % (ref 0–7)
ERYTHROCYTE [DISTWIDTH] IN BLOOD BY AUTOMATED COUNT: 12.7 % (ref 11.5–14.5)
GLUCOSE SERPL-MCNC: 99 MG/DL (ref 70–220)
HCT VFR BLD CALC: 37.1 % (ref 37–47)
HGB BLD-MCNC: 11.9 G/DL (ref 12–16)
LYMPHOCYTES # BLD AUTO: 1.8 10^3/UL (ref 0.8–2.9)
LYMPHOCYTES NFR BLD AUTO: 23.4 % (ref 15–51)
MAGNESIUM SERPL-MCNC: 1.9 MG/DL (ref 1.7–2.5)
MCH RBC QN AUTO: 29.2 PG (ref 29–33)
MCHC RBC AUTO-ENTMCNC: 32.1 G/DL (ref 32–37)
MCV RBC AUTO: 90.9 FL (ref 82–101)
MONOCYTES # BLD: 0.5 10^3/UL (ref 0.3–0.9)
MONOCYTES NFR BLD: 7.2 % (ref 0–11)
NEUTROPHILS # BLD: 5.1 10^3/UL (ref 1.6–7.5)
NEUTROPHILS NFR BLD AUTO: 68.1 % (ref 39–77)
NRBC # BLD MANUAL: 0 10^3/UL (ref 0–0)
NRBC BLD QL: 0 /100WBC (ref 0–0)
PHOSPHATE SERPL-MCNC: 3.6 MG/DL (ref 2.5–4.9)
PLATELET # BLD: 209 10^3/UL (ref 140–415)
PMV BLD AUTO: 11.6 FL (ref 7.4–10.4)
POTASSIUM SERPL-SCNC: 4.2 MMOL/L (ref 3.5–5.1)
RBC # BLD AUTO: 4.08 10^6/UL (ref 4.2–5.4)
SODIUM SERPL-SCNC: 137 MMOL/L (ref 135–144)
WBC # BLD AUTO: 7.5 10^3/UL (ref 4.8–10.8)

## 2017-05-22 RX ADMIN — HYDROCODONE BITARTRATE AND ACETAMINOPHEN PRN TAB: 5; 325 TABLET ORAL at 23:47

## 2017-05-22 RX ADMIN — FAMOTIDINE SCH MG: 20 TABLET ORAL at 21:22

## 2017-05-22 RX ADMIN — FAMOTIDINE SCH MG: 20 TABLET ORAL at 10:15

## 2017-05-22 RX ADMIN — FOLIC ACID SCH MLS/HR: 5 INJECTION, SOLUTION INTRAMUSCULAR; INTRAVENOUS; SUBCUTANEOUS at 18:29

## 2017-05-22 RX ADMIN — CEFAZOLIN SCH MLS/HR: 1 INJECTION, POWDER, FOR SOLUTION INTRAMUSCULAR; INTRAVENOUS at 04:25

## 2017-05-22 RX ADMIN — HYDROMORPHONE HYDROCHLORIDE PRN MG: 1 INJECTION, SOLUTION INTRAMUSCULAR; INTRAVENOUS; SUBCUTANEOUS at 12:23

## 2017-05-22 RX ADMIN — FOLIC ACID SCH MLS/HR: 5 INJECTION, SOLUTION INTRAMUSCULAR; INTRAVENOUS; SUBCUTANEOUS at 10:17

## 2017-05-22 RX ADMIN — HYDROMORPHONE HYDROCHLORIDE PRN MG: 1 INJECTION, SOLUTION INTRAMUSCULAR; INTRAVENOUS; SUBCUTANEOUS at 19:50

## 2017-05-22 RX ADMIN — HYDROCODONE BITARTRATE AND ACETAMINOPHEN PRN TAB: 5; 325 TABLET ORAL at 07:19

## 2017-05-22 RX ADMIN — HYDROMORPHONE HYDROCHLORIDE PRN MG: 1 INJECTION, SOLUTION INTRAMUSCULAR; INTRAVENOUS; SUBCUTANEOUS at 02:21

## 2017-05-22 RX ADMIN — HYDROCODONE BITARTRATE AND ACETAMINOPHEN PRN TAB: 5; 325 TABLET ORAL at 15:28

## 2017-05-22 NOTE — PN
Date/Time of Note


Date/Time of Note


DATE: 5/22/17 


TIME: 11:53





Assessment/Plan


VTE Prophylaxis


VTE Prophylaxis Intervention:  SCD's





Lines/Catheters


IV Catheter Type (from Nrs):  Peripheral IV





Assessment/Plan


Chief Complaint/Hosp Course


1.  Acute comminuted fracture of the distal radial metaphysis extending to the 

articular surface. S/P manipulative reduction of the distal radius fracture of 

the left wrist and immobilization with pin fixation. Continue pain control.





2.  Nicotine use.  Cessation advised.





3.  Normocytic normochromic anemia.  Monitor H and H closely.





4.  Fluid, electrolytes and nutrition.  Regular diet as tolerated.





5.  Deep venous thrombosis prophylaxis.  As per orthopedic surgery.





6.  Gastrointestinal prophylaxis.  Histamine 2 receptor blockers.  





7.  Plan. Continue pain control.  Postoperative management as per orthopedic 

surgery.  Await clearance from orthopedic surgery before discharge.


 


The case was discussed with .


Problems:  





Subjective


24 Hr Interval Summary


Free Text/Dictation


Has left upper extremity pain.





Exam/Review of Systems


Vital Signs


Vitals





 Vital Signs








  Date Time  Temp Pulse Resp B/P Pulse Ox O2 Delivery O2 Flow Rate FiO2


 


5/22/17 07:51 98.0 52 19 126/78 98   


 


5/21/17 16:30      Nasal Cannula  


 


5/21/17 13:18       2.0 














 Intake and Output   


 


 5/21/17 5/21/17 5/22/17





 14:59 22:59 06:59


 


Intake Total 1100 ml 1400 ml 1250 ml


 


Output Total 1 ml  


 


Balance 1099 ml 1400 ml 1250 ml











Exam


GENERAL:  This is a well-built, well-nourished female patient lying in bed in 

no apparent distress.


HEENT:  Head normocephalic and atraumatic.  Eyes:  Anicteric sclerae.  

Conjunctivae clear.


ENT:  Nasal septum is midline.  Oral mucosa is dry.


NECK:  Supple.  No JVD noticed.


RESPIRATORY:  Bilaterally clear to auscultation.  No adventitious breath sounds 

heard.  No use of accessory muscles of respiration.


CARDIAC:  Regular rate and rhythm.  No murmurs.


ABDOMEN:  Soft, nontender and nondistended.  Bowel sounds positive in all 4 

quadrants.


GENITOURINARY:  Deferred.


EXTREMITIES:  Left upper extremity plaster cast.  Fingers of the left hand 

warm.  Bilateral lower extremities, no edema.  Peripheral pulses palpable.


NEUROLOGIC:  The patient is awake, alert and oriented.  Cranial nerves are 

grossly intact.





Results


Result Diagram:  


5/22/17 0443                                                                   

             5/22/17 0443





Results 24 hrs





Laboratory Tests








Test


  5/22/17


04:43


 


White Blood Count 7.5  


 


Red Blood Count 4.08  L


 


Hemoglobin 11.9  L


 


Hematocrit 37.1  


 


Mean Corpuscular Volume 90.9  


 


Mean Corpuscular Hemoglobin 29.2  


 


Mean Corpuscular Hemoglobin


Concent 32.1  


 


 


Red Cell Distribution Width 12.7  


 


Platelet Count 209  


 


Mean Platelet Volume 11.6  H


 


Neutrophils % 68.1  


 


Lymphocytes % 23.4  


 


Monocytes % 7.2  


 


Eosinophils % 0.9  


 


Basophils % 0.1  


 


Nucleated Red Blood Cells % 0.0  


 


Neutrophils # 5.1  


 


Lymphocytes # 1.8  


 


Monocytes # 0.5  


 


Eosinophils # 0.1  


 


Basophils # 0.0  


 


Nucleated Red Blood Cells # 0.0  


 


Sodium Level 137  


 


Potassium Level 4.2  


 


Chloride Level 109  


 


Carbon Dioxide Level 24  


 


Anion Gap 8  


 


Blood Urea Nitrogen 8  


 


Creatinine 0.63  


 


Glucose Level 99  


 


Calcium Level 8.5  


 


Phosphorus Level 3.6  


 


Magnesium Level 1.9  











Medications


Medications





 Current Medications


Ondansetron HCl (Zofran Inj) 4 mg Q6H  PRN IV NAUSEA AND/OR VOMITING;  Start 5/ 20/17 at 16:30


Acetaminophen (Tylenol Tab) 650 mg Q6H  PRN PO PAIN LEVEL 1-3 OR FEVER;  Start 5 /20/17 at 16:30


Acetaminophen (Tylenol Supp) 650 mg Q6H  PRN [place] PAIN LEVEL 1-3 OR FEVER;  Start 

5/20/17 at 16:30


Acetaminophen/ Hydrocodone Bitart (Norco (5/325)) 1 tab Q6H  PRN PO MODERATE 

PAIN LEVEL 4-6 Last administered on 5/20/17at 18:10; Admin Dose 1 TAB;  Start 5/ 20/17 at 16:30


Acetaminophen/ Hydrocodone Bitart (Norco (5/325)) 2 tab Q6H  PRN PO SEVERE PAIN 

LEVEL 7-10 Last administered on 5/22/17at 07:19; Admin Dose 2 TAB;  Start 5/20/ 17 at 16:30


Morphine Sulfate (morphine) 2 mg Q4H  PRN IV SEVERE PAIN LEVEL 7-10 Last 

administered on 5/20/17at 21:00; Admin Dose 2 MG;  Start 5/20/17 at 16:30


Hydromorphone HCl (Dilaudid) 0.5 mg Q4H  PRN IV SEVERE PAIN LEVEL 7-10 Last 

administered on 5/22/17at 02:21; Admin Dose 0.5 MG;  Start 5/20/17 at 16:30


Docusate Sodium (Colace) 100 mg Q12H  PRN PO CONSTIPATION;  Start 5/20/17 at 16:

30


Magnesium Hydroxide (Milk Of Mag) 30 ml DAILY  PRN PO CONSTIPATION;  Start 5/20/ 17 at 16:30


Bisacodyl 10 mg 10 mg DAILY  PRN [place] CONSTIPATION;  Start 5/20/17 at 16:30


Sodium Chloride (NS) 1,000 ml @  100 mls/hr Q10H IV  Last administered on 5/22/ 17at 10:17; Admin Dose 100 MLS/HR;  Start 5/21/17 at 12:29


Morphine Sulfate (morphine) 2 mg Q2H  PRN IV PAIN;  Start 5/21/17 at 12:30


Acetaminophen/ Hydrocodone Bitart (Norco (5/325)) 1 tab Q3H  PRN PO PAIN;  

Start 5/21/17 at 12:30


Famotidine (Pepcid) 20 mg BID PO  Last administered on 5/22/17at 10:15; Admin 

Dose 20 MG;  Start 5/21/17 at 21:00











LUIS FERNANDO TODD NP May 22, 2017 11:53

## 2017-05-22 NOTE — CONS
Date/Time of Note


Date/Time of Note


DATE: 5/22/17 


TIME: 09:27





Consultation Date/Type/Reason


Admit Date/Time


May 20, 2017 at 12:08


Initial Consult Date


5/22/17


Type of Consultation:  Anesthesiology


Reason for Consultation


Follow up





24 HR Interval Summary


Free Text/Dictation


Pt seen and examined at bedside is POD#1 s/p L wrist fracture repair with pins. 

Pt received GETA and Supraclavicular block for post op pain control. she states 

he started having pain last night around midnight but was very comfortable up 

tp that point. No N/V/D/C/numbess/weakness/HA. Will continue to follow.


Constitutional:  improved, no complaints





Exam/Review of Systems


Vital Signs


Vitals





 Vital Signs








  Date Time  Temp Pulse Resp B/P Pulse Ox O2 Delivery O2 Flow Rate FiO2


 


5/22/17 07:51 98.0 52 19 126/78 98   


 


5/21/17 16:30      Nasal Cannula  


 


5/21/17 13:18       2.0 














 Intake and Output   


 


 5/21/17 5/21/17 5/22/17





 15:00 23:00 07:00


 


Intake Total 1100 ml 1400 ml 1250 ml


 


Output Total 1 ml  


 


Balance 1099 ml 1400 ml 1250 ml











Results


Result Diagram:  


5/22/17 0443                                                                   

             5/22/17 0443





Results 24 hrs





Laboratory Tests








Test


  5/22/17


04:43


 


White Blood Count 7.5  


 


Red Blood Count 4.08  L


 


Hemoglobin 11.9  L


 


Hematocrit 37.1  


 


Mean Corpuscular Volume 90.9  


 


Mean Corpuscular Hemoglobin 29.2  


 


Mean Corpuscular Hemoglobin


Concent 32.1  


 


 


Red Cell Distribution Width 12.7  


 


Platelet Count 209  


 


Mean Platelet Volume 11.6  H


 


Neutrophils % 68.1  


 


Lymphocytes % 23.4  


 


Monocytes % 7.2  


 


Eosinophils % 0.9  


 


Basophils % 0.1  


 


Nucleated Red Blood Cells % 0.0  


 


Neutrophils # 5.1  


 


Lymphocytes # 1.8  


 


Monocytes # 0.5  


 


Eosinophils # 0.1  


 


Basophils # 0.0  


 


Nucleated Red Blood Cells # 0.0  


 


Sodium Level 137  


 


Potassium Level 4.2  


 


Chloride Level 109  


 


Carbon Dioxide Level 24  


 


Anion Gap 8  


 


Blood Urea Nitrogen 8  


 


Creatinine 0.63  


 


Glucose Level 99  


 


Calcium Level 8.5  


 


Phosphorus Level 3.6  


 


Magnesium Level 1.9  











Medications


Medications





 Current Medications


Ondansetron HCl (Zofran Inj) 4 mg Q6H  PRN IV NAUSEA AND/OR VOMITING;  Start 5/ 20/17 at 16:30


Acetaminophen (Tylenol Tab) 650 mg Q6H  PRN PO PAIN LEVEL 1-3 OR FEVER;  Start 5 /20/17 at 16:30


Acetaminophen (Tylenol Supp) 650 mg Q6H  PRN [place] PAIN LEVEL 1-3 OR FEVER;  Start 

5/20/17 at 16:30


Acetaminophen/ Hydrocodone Bitart (Norco (5/325)) 1 tab Q6H  PRN PO MODERATE 

PAIN LEVEL 4-6 Last administered on 5/20/17at 18:10; Admin Dose 1 TAB;  Start 5/ 20/17 at 16:30


Acetaminophen/ Hydrocodone Bitart (Norco (5/325)) 2 tab Q6H  PRN PO SEVERE PAIN 

LEVEL 7-10 Last administered on 5/22/17at 07:19; Admin Dose 2 TAB;  Start 5/20/ 17 at 16:30


Morphine Sulfate (morphine) 2 mg Q4H  PRN IV SEVERE PAIN LEVEL 7-10 Last 

administered on 5/20/17at 21:00; Admin Dose 2 MG;  Start 5/20/17 at 16:30


Hydromorphone HCl (Dilaudid) 0.5 mg Q4H  PRN IV SEVERE PAIN LEVEL 7-10 Last 

administered on 5/22/17at 02:21; Admin Dose 0.5 MG;  Start 5/20/17 at 16:30


Docusate Sodium (Colace) 100 mg Q12H  PRN PO CONSTIPATION;  Start 5/20/17 at 16:

30


Magnesium Hydroxide (Milk Of Mag) 30 ml DAILY  PRN PO CONSTIPATION;  Start 5/20/ 17 at 16:30


Bisacodyl 10 mg 10 mg DAILY  PRN [place] CONSTIPATION;  Start 5/20/17 at 16:30


Sodium Chloride (NS) 1,000 ml @  100 mls/hr Q10H IV  Last administered on 5/21/ 17at 22:56; Admin Dose 100 MLS/HR;  Start 5/21/17 at 12:29


Morphine Sulfate (morphine) 2 mg Q2H  PRN IV PAIN;  Start 5/21/17 at 12:30


Acetaminophen/ Hydrocodone Bitart (Norco (5/325)) 1 tab Q3H  PRN PO PAIN;  

Start 5/21/17 at 12:30


Famotidine (Pepcid) 20 mg BID PO  Last administered on 5/21/17at 20:26; Admin 

Dose 20 MG;  Start 5/21/17 at 21:00











NIK GRIDER May 22, 2017 09:29

## 2017-05-23 VITALS — DIASTOLIC BLOOD PRESSURE: 68 MMHG | RESPIRATION RATE: 17 BRPM | SYSTOLIC BLOOD PRESSURE: 116 MMHG

## 2017-05-23 RX ADMIN — FAMOTIDINE SCH MG: 20 TABLET ORAL at 08:25

## 2017-05-23 RX ADMIN — HYDROMORPHONE HYDROCHLORIDE PRN MG: 1 INJECTION, SOLUTION INTRAMUSCULAR; INTRAVENOUS; SUBCUTANEOUS at 02:54

## 2017-05-23 RX ADMIN — HYDROCODONE BITARTRATE AND ACETAMINOPHEN PRN TAB: 5; 325 TABLET ORAL at 06:06

## 2017-05-23 RX ADMIN — FOLIC ACID SCH MLS/HR: 5 INJECTION, SOLUTION INTRAMUSCULAR; INTRAVENOUS; SUBCUTANEOUS at 03:33

## 2017-05-23 NOTE — PDOCDIS
Discharge Instructions


DIAGNOSIS


Discharge Diagnosis:  Left a distal radial fracture





CONDITION


Patient Condition:  Stable





HOME CARE INSTRUCTIONS:


Diet Instructions:  Regular





ACTIVITY:








Activity Restrictions:   Slowly Increase Activity





 Rest between Activity





 Avoid heavy lifting





 Do not Drive





 Keep Limb Elevated





Bathing Restrictions:  Tub Bath





FOLLOW UP/APPOINTMENTS


Appointments


Amaris Coello MD 


Specialty: Orthopedic Surgery 


Office Address: [address] [place], [place] [id number] 


Office Telephone: [phone]





OTHER ORDERS:


Other Orders:


1.  Take pain medications as needed.  Complete the course of antibiotics.


2.  Regular diet as tolerated.


3.  Follow-up with Dr. Coello in 1 week.  Please call for appointment.


4.  Please call Dr. Coello or go to the nearest emergency room if you have very 

severe left arm pain despite taking pain medication, or if you have loss of 

sensation of the left upper extremity or significant swelling of the left upper 

extremity.


5.  Keep the left upper extremity elevated.





SCHOOL/WORK RELEASE


May return to School/Work on:  May 29, 2017


May return to School/Work with:  With Restrictions


School/Work Release Comment:  No heavy lifting using left arm for 6 weeks.











LUIS FERNANDO TODD NP May 23, 2017 08:59

## 2017-05-23 NOTE — DS
DATE OF ADMISSION: 05/20/2017



DATE OF DISCHARGE: 05/23/2017

 

FINAL DIAGNOSES:

1.  Impacted comminuted fracture of the left distal radial metaphysis 
extremities to the articular surface. Status post manipulated reduction of the 
distal radius fracture of the left wrist and immobilization with pin fixation.  

2.  Nicotine use.  

3.  Normocytic normochromic anemia.

 

CONSULTATIONS:

1.  Dr. Rg Coello, Orthopedic Surgery.

 

HOSPITAL COURSE:  This is a 37-year-old female with no significant past medical 
history who came to the emergency room due to a recent fall during exercise,  
subsequently leading to left arm pain and swelling.  The patient denied any 
head injury or loss of consciousness.  The patient was placed in an air splint 
by the EMS to her left arm before transporting her to the emergency room.  In 
the emergency room, the patient underwent a left wrist x-ray that showed 
comminuted intraarticular distal radial fracture of the left side with moderate 
to severe displacement of the fracture fragments.  Provided the patient's 
history of present illness and the diagnostic findings, a clinical decision was 
made to admit the patient to inpatient setting to have her further evaluated.  
An orthopedic surgery consult was called on this patient.  The patient was 
started on adequate pain control.  



The patient underwent a manipulative reduction of the distal radius fracture of 
the left wrist and immobilization with pin fixation on 05/21/2017 by Dr. Coello.  
The patient also had a left distal clavicular nerve block by anesthesiology.  
Postoperatively, the patient was started on a diet.  The patient had no 
significant gastrointestinal problems.  The patient was provided with adequate 
pain control.  The patient's left upper extremity was kept elevated.  The 
patient was maintained on antibiotics as per orthopedic surgery.  No systemic 
anticoagulation was indicated as per orthopedic surgery.  The patient is a 
current nicotine user.  The patient was advised on quitting the use of 
nicotine.  The patient was also noticed to have some normocytic normochromic 
anemia.  However, the anemia was not significant enough to necessitate any 
blood transfusion.  The patient was cleared by orthopedic surgery to be 
discharged home.

 

PLAN:  Patient will be discharged home today.  The patient was instructed to 
take pain medications as needed and to complete the course of antibiotics.  The 
patient was instructed to take a regular diet as tolerated.  She was instructed 
to follow up with Dr. Coello in 1 week and to please call Dr. Coello's office for 
appointment.  She was instructed to please call Dr. Coello or report to the 
nearest emergency room or if she has very severe left arm pain despite pain 
medications or if she has loss of sensation of the left upper extremity or 
significant swelling of the left upper extremity.  The patient was instructed 
to keep the left upper extremity elevated.  The patient verbalized 
understanding of her discharge instructions.  The patient was also given a 
letter to her job that she can return to work on 05/29/2017 with restriction of 
no heavy lifting using left arm for next 6 weeks.

 

CONDITION AT DISCHARGE:  Stable.

 

DISCHARGE MEDICATIONS:

1.  Keflex 500 mg p.o. q.8h.  x7 days.  

2.  Norco 10/325 one tablet p.o. q. p.r.n. pain.

3.  Colace 100 mg p.o. b.i.d. for 7 days.

 

PERTINENT LABORATORIES AND DIAGNOSTIC DATA:

1.  Manual reduction of the distal radius fracture of the left wrist and 
immobilization with pin fixation.

2.  Left wrist x-ray on admission.  Comminuted intraarticular distal radial 
fracture with moderate to severe displacement of fracture fragments.  Ulnar 
styloid process fracture was also seen.

3.  Left upper extremity CT scan.  Acute comminuted fracture of the distal 
radial metaphysis extending to the articular surface with severe angulation and 
displacement of fracture fragments.  Mildly displaced acute fracture of the 
ulnar styloid process.

4.  Chest x-ray.  Mild cardiomegaly.

5.  Latest CBC:  WBC 7.5, hemoglobin 11.9, hematocrit 37.1, platelet count 209.
  

6.  Latest BMP:  Sodium 137, potassium 4.2, chloride 109, carbon dioxide 24, 
anion gap 8, BUN 8, creatinine 0.63, glucose 99, calcium 8.5, phosphorus 3.6, 
magnesium 1.9.

7.  Hemoglobin A1c 5.3.

8.  Fasting lipid panel:  Triglycerides 65, total cholesterol 152, LDL 80, AST 
of 59.

 

At this time, I would like to thank Dr. Rg Coello for seeing the patient, doing 
the necessary procedures, and providing clinical recommendations.

 

The case and management of this patient was fully discussed with Dr. Bruce.

 

Approximately 35 minutes was spent on coordinating the discharge on this 
patient.

 

 

LUIS FERNANDO BRUCE MD, AM/BRIAN

DD:    05/23/2017 10:31:04

DT:    05/23/2017 11:02:19

Conf#: [id number]

DID#:  [id number]

 

[facility]

## 2019-01-25 ENCOUNTER — HOSPITAL ENCOUNTER (EMERGENCY)
Dept: HOSPITAL 91 - FTE | Age: 39
Discharge: HOME | End: 2019-01-25
Payer: COMMERCIAL

## 2019-01-25 ENCOUNTER — HOSPITAL ENCOUNTER (EMERGENCY)
Dept: HOSPITAL 10 - FTE | Age: 39
Discharge: HOME | End: 2019-01-25
Payer: COMMERCIAL

## 2019-01-25 VITALS — SYSTOLIC BLOOD PRESSURE: 123 MMHG | HEART RATE: 75 BPM | RESPIRATION RATE: 19 BRPM | DIASTOLIC BLOOD PRESSURE: 75 MMHG

## 2019-01-25 VITALS — BODY MASS INDEX: 33.47 KG/M2 | WEIGHT: 144.62 LBS | HEIGHT: 55 IN

## 2019-01-25 DIAGNOSIS — J02.0: Primary | ICD-10-CM

## 2019-01-25 PROCEDURE — 99283 EMERGENCY DEPT VISIT LOW MDM: CPT

## 2019-01-25 NOTE — ERD
ER Documentation


Chief Complaint


Chief Complaint





bib self, cc: sore throat since tuesday morning, fever





HPI


38-year-old female presents for sore throat times 2 days. patient states that 


she has associated fever of 103 at home. she also admits to slight cough.  


Denies chest pain or shortness of breath.  She took ibuprofen at home with mild 


relief.  No other complaints.





ROS


All systems reviewed and are negative except as per history of present illness.





Medications


Home Meds


Active Scripts


Ibuprofen* (Ibuprofen*) 600 Mg Tablet, 600 MG PO Q6H PRN for PAIN, #30 TAB


   Prov:SUKI KINNEY DO         1/25/19


Penicillin V Potassium* (Penicillin V K*) 500 Mg Tab, 500 MG PO BID for strep 


pharyngitis for 10 Days, #20 TAB


   Prov:SUKI KINNEY DO         1/25/19


Docusate Sodium* (Colace*) 100 Mg Capsule, 100 MG PO BID for 7 Days, #14 CAP


   Prov:LUIS FERNANDO TODD NP         5/23/17


Cephalexin* (Keflex*) 500 Mg Capsule, 500 MG PO Q8 for 7 Days, #21 CAP


   Prov:LUIS FERNANDO TODD NP         5/23/17


Hydrocodone/Acetaminophen (Norco  Tablet) 1 Each Tablet, 1 EACH PO Q6H for


PAIN, #30 TAB


   Prov:LUIS FERNANDO TODD NP         5/23/17





Allergies


Allergies:  


Coded Allergies:  


     No Known Allergies (Verified  Allergy, 5/16/12)





PMhx/Soc


History of Surgery:  Yes (CSECTION )


Anesthesia Reaction:  No


Hx Neurological Disorder:  No


Hx Respiratory Disorders:  No


Hx Cardiac Disorders:  No


Hx Psychiatric Problems:  No


Hx Miscellaneous Medical Probl:  No


Hx Alcohol Use:  No


Hx Substance Use:  No


Hx Tobacco Use:  No





Physical Exam


Vitals


Vital Signs


  Date      Temp  Pulse  Resp  B/P (MAP)   Pulse Ox  O2          O2 Flow    FiO2


Time                                                 Delivery    Rate


   1/25/19  98.9     75    19      123/75       100


     14:13                           (91)





Physical Exam


Const:   No acute distress


Head:   Atraumatic 


Eyes:    Normal Conjunctiva


ENT:    Normal External Ears, bilateral tympanic membrane intact without 


erythema or bulging noted, nose and lateral tonsillar swelling and exudate noted


Neck:               Full range of motion. No meningismus.  Anterior 


lymphadenopathy noted


Resp:   Clear to auscultation bilaterally, no wheezing, rales, rhonchi


Cardio:   Regular rate and rhythm, no murmurs


Skin:   No petechiae or rashes


Ext:    No cyanosis, or edema


Neur:   Awake and alert


Psych:    Normal Mood and Affect





Procedures/MDM


Medical Decision Making:





Differential diagnosis includes but not limited to upper respiratory infection, 


pneumonia, sepsis, strep pharyngitis





Patient appeared well on physical examination, nontoxic appearing.  Lungs were 


clear to auscultation bilaterally.  There is low suspicion for pneumonia, 


sepsis.





Patient 3 of 4 Cent[facility] criteria, patient will be treated for strep pharyngitis.


Patient given prescription for antibiotics.  Also given prescription for Motrin








Patient advised to follow up with PCP in 1-2 days. Patient advised to return to 


ED for new or worsening symptoms. Patient stable on discharge from the ED.











Disclaimer: Inadvertent spelling and grammatical errors are likely due to 


EHR/dictation software use and do not reflect on the overall quality of patient 


care. Also, please note that the electronic time recorded on this note does not 


necessarily reflect the actual time of the patient encounter.





Departure


Diagnosis:  


   Primary Impression:  


   Strep pharyngitis


Condition:  Fair


Patient Instructions:  Pharyngitis, Strep (Presumed)


Referrals:  


[facility]


YOU HAVE RECEIVED A MEDICAL SCREENING EXAM AND THE RESULTS INDICATE THAT YOU DO 


NOT HAVE A CONDITION THAT REQUIRES URGENT TREATMENT IN THE EMERGENCY DEPARTMENT.





FURTHER EVALUATION AND TREATMENT OF YOUR CONDITION CAN WAIT UNTIL YOU ARE SEEN 


IN YOUR DOCTORS OFFICE WITHIN THE NEXT 1-2 DAYS. IT IS YOUR RESPONSIBILITY TO 


MAKE AN APPOINTMENT FOR FOLOW-UP CARE.





IF YOU HAVE A PRIMARY DOCTOR


--you should call your primary doctor and schedule an appointment





IF YOU DO NOT HAVE A PRIMARY DOCTOR YOU CAN CALL OUR PHYSICIAN REFERRAL HOTLINE 


AT


 [phone] 





IF YOU CAN NOT AFFORD TO SEE A PHYSICIAN YOU CAN CHOSE FROM THE FOLLOWING 


[facility]





[facility] [phone] [address] [facility]. [facility] [phone] [address] [facility]. [facility] [phone] [address] [facility]. [facility] [phone] [address] [facility]. [facility] [phone] [address] [facility]. [facility]. [phone] [address]





Additional Instructions:  


Call your primary care doctor TOMORROW for an appointment during the next 1-2 


days.See the doctor sooner or return here if your condition worsens before your 


appointment time.











SUKI KINNEY DO                 Jan 25, 2019 18:28